# Patient Record
Sex: FEMALE | Employment: OTHER | URBAN - METROPOLITAN AREA
[De-identification: names, ages, dates, MRNs, and addresses within clinical notes are randomized per-mention and may not be internally consistent; named-entity substitution may affect disease eponyms.]

---

## 2017-03-21 ENCOUNTER — HOSPITAL ENCOUNTER (OUTPATIENT)
Dept: NUTRITION | Age: 37
Discharge: HOME | End: 2017-03-21
Payer: COMMERCIAL

## 2017-03-21 PROCEDURE — 97802 MEDICAL NUTRITION INDIV IN: CPT

## 2017-04-18 ENCOUNTER — APPOINTMENT (OUTPATIENT)
Dept: NUTRITION | Age: 37
End: 2017-04-18
Payer: COMMERCIAL

## 2017-05-12 ENCOUNTER — HOSPITAL ENCOUNTER (OUTPATIENT)
Dept: NUTRITION | Age: 37
Discharge: HOME | End: 2017-05-12
Payer: COMMERCIAL

## 2017-05-12 PROCEDURE — 97803 MED NUTRITION INDIV SUBSEQ: CPT

## 2017-05-12 NOTE — PROGRESS NOTES
NUTRITION  DAILY TREATMENT NOTE  Patient Name: Avila Joe         Date: 2017  : 1980    YES Patient  Verified  Insurance: Payor: BLUE CROSS / Plan: [facility] PPO / Product Type: PPO /    Diagnosis:     Morbid Obesity  In time:   1030             Out time:   1100   Total Treatment Time (min):   30     SUBJECTIVE    Medication Changes/New allergies or changes in medical history, any new surgeries or procedures? 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 Doesn't always want to do what her daughter is doing for exercise so she ends up doing nothing. Admits that she continues to drink soda. Not food journaling. Current Wt: 271.6 Previous Wt: 272.6 Wt Change: -1#     OBJECTIVE    Patient Education:  [x]  Review current plan with patient   []  Other:    Handouts/  Information Provided: []  Carbohydrates  []  Protein  []  Fiber  []  Serving Sizes  []  Fluids  []  General guidelines []  Diabetes  []  Cholesterol  []  Sodium  []  SBGM  []  Food Journals  []  Others:    Estimated Needs: 1450 91 gm 163 gm 48 gm    Calories Protein CHO Fat     ASSESSMENT    []  Pt Progressing Well  [x]  Slow Progress []  No Progress    Other:    [x]  Understand Dietary Changes/Recs []  No Change [x]  Improving []  N/A   [x]  Weight-stable []  No Change []  Improving []  N/A     Glucose Levels []  No Change []  Improving []  N/A   []  Cholesterol Levels []  No Change []  Improving []  N/A     RECOMMENDATIONS    1. Pt commits to food journaling completely (including times and beverages) at least 3 days out of 7 per week. 2. Pt will research alternative exercise ideas separate from her daughter-will schedule for the week. Will come up with at least 2 new ideas.    3. Pt will continue to decrease and eliminate sweet drinks-no more than 1 serving per week maximum-discussed alternatives. 4. Pt will set up a schedule for each day at least 4 days per week to include work commitments, meals, exercise to add more structure to her day. 5. PLAN    []  Continue on current plan []  Follow-up PRN   []  Discharge due to :    [x]  Next Appt[de-identified] Friday, June 9, 2017 at 10 a.m.      Dietitian: Kiya Hayes MS, RD, CDE    Date: 5/12/2017 Time: 12:15 PM

## 2017-06-09 ENCOUNTER — APPOINTMENT (OUTPATIENT)
Dept: NUTRITION | Age: 37
End: 2017-06-09

## 2017-08-18 ENCOUNTER — APPOINTMENT (OUTPATIENT)
Dept: NUTRITION | Age: 37
End: 2017-08-18

## 2021-08-05 ENCOUNTER — HOSPITAL ENCOUNTER (OUTPATIENT)
Dept: NUTRITION | Age: 41
Discharge: HOME | End: 2021-08-05
Payer: COMMERCIAL

## 2021-08-05 PROCEDURE — 97802 MEDICAL NUTRITION INDIV IN: CPT

## 2021-09-02 ENCOUNTER — HOSPITAL ENCOUNTER (OUTPATIENT)
Dept: NUTRITION | Age: 41
Discharge: HOME | End: 2021-09-02
Payer: COMMERCIAL

## 2021-09-02 PROCEDURE — 97803 MED NUTRITION INDIV SUBSEQ: CPT

## 2021-09-02 NOTE — PROGRESS NOTES
NUTRITION  FOLLOW-UP TREATMENT NOTE  Patient Name: Leesa Davenport         Date: 2021  : 1980    YES/NO Patient  Verified  Diagnosis: Severe Obesity, Pre-Diabetes   In time:   1100             Out time:   1130   Total Treatment Time (min):   30     SUBJECTIVE/ASSESSMENT    Current Wt: 273.4 Previous Wt: 278 Wt Change: -4.6#     Initial Wt: 278 Total Wt change: -4.6# Height: 67\"     Changes in medication or medical history? Any new allergies, surgeries or procedures? YES/NO    If yes, update Summary List   Patient requested a referral for a sleep study (updated). Has not heard back from the provider to set up the study. Pt has increased her exercise-either walking or working out with  3-4x/week. Nutrition Diagnosis        Diagnosis Status: Severe Obesity related to excess energy intake and physical inactivity as evidenced by BMI >40          [x]  Improved []  No Change    []  Declined        Nutrition Monitoring and Evaluation: -Pt has been much more aware of her absent minded eating and has purposely removed or hidden trigger foods from view. Admits to some sweet tea but has avoided most other sweet drinks. Occasionally going longer than 5 hours without eating and carbohydrates tend to be stockpiled in a later meal.         Nutrition Prescription and  Intervention -Discussed alternatives for the sweet tea and how to \"wean\" her taste off the sweet tea and get used to tea with a lower sugar content or none at all. Reminded pt not to go longer than 5 hours without eating and to eat within 1-2 hours of waking.   Provided suggestions for \"on the go\" protein and controlled carbohydrate meals/snacks       Patient Education:  [x]  Review current plan with patient   []  Other:    Handouts/  Information Provided: []  Carbohydrates  []  Protein  []  Fiber  []  Serving Sizes  []  Fluids  []  General guidelines []  Diabetes  []  Cholesterol  []  Sodium  []  SBGM  []  Food Journals  []  Others:        Patient Goals -Pt will strive for a minimum of 180 minutes of exercise per week  -Pt will follow up on Sleet study referral     PLAN    []  Continue on current plan []  Follow-up PRN   []  Discharge due to :    [x]  Next appt: Thursday, 10-7-2021 at 9 a.m.      Dietitian: Jacy Encarnacion MS,RD, CDCES    Date: 9/2/2021 Time: 12:13 PM

## 2021-10-14 ENCOUNTER — HOSPITAL ENCOUNTER (OUTPATIENT)
Dept: NUTRITION | Age: 41
Discharge: HOME | End: 2021-10-14
Payer: COMMERCIAL

## 2021-10-14 PROCEDURE — 97803 MED NUTRITION INDIV SUBSEQ: CPT

## 2021-10-14 NOTE — PROGRESS NOTES
NUTRITION  FOLLOW-UP TREATMENT NOTE  Patient Name: Bobby Deleon         Date: 10/14/2021  : 1980    YES/NO Patient  Verified  Diagnosis: Severe Obesity, Pre-Diabetes   In time:   1130             Out time:   1215   Total Treatment Time (min):   45     SUBJECTIVE/ASSESSMENT    Current Wt: 271 Previous Wt: 273.4 Wt Change: -2.4     Initial Wt: 278 Total Wt change: -7.0# Height: 67\"     Changes in medication or medical history? Any new allergies, surgeries or procedures? YES/NO    If yes, update Summary List   Pt had COVID at the beginning of September. Is not yet vaccinated. Still some fatigue but has tried to start exercising again. 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 Due to her being sick, still has not had her sleep study done. Nutrition Diagnosis        Diagnosis Status:  Severe Obesity related to excess energy intake and physical inactivity as evidenced by BMI >40              [x]  Improved []  No Change    []  Declined        Nutrition Monitoring and Evaluation: -Pt admits that sometimes, especially when she is teaching, she will go all day without eating. Primarily calorie free beverages but admits to some soda when recovering from [place]. Nutrition Prescription and  Intervention -Reviewed ideas for patient for quick options for food when she is at school.    -Calorie free beverages only  -Talk to her PA about sleep study and still getting vaccinated. Patient Education:  [x]  Review current plan with patient   []  Other:    Handouts/  Information Provided: []  Carbohydrates  []  Protein  []  Fiber  []  Serving Sizes  []  Fluids  []  General guidelines []  Diabetes  []  Cholesterol  []  Sodium  []  SBGM  []  Food Journals  []  Others:        Patient Goals -Pt will continue to work up to 180 minutes of exercise per week.        PLAN    []  Continue on current plan []  Follow-up PRN   []  Discharge due to :    [x]  Next appt: Thursday, 11- at 11:30 a.m.      Dietitian: Yamini Olivera MS,RD, [facility]    Date: 10/14/2021 Time: 11:38 AM

## 2023-01-01 NOTE — PROGRESS NOTES
[facility] - Outpatient Nutrition Services  [address] [facility]. [facility], [address] [place] [address]   Nutrition Assessment  Medical Nutrition Therapy   Outpatient Initial Evaluation         Patient Name: Rory Dodd : 1980   Treatment Diagnosis: Severe Obesity, Pre-Diabetes   Referral Source: Scottie Enriquez Start  Care [facility] [place]): 2021     Gender: female Age: 35 y.o. Ht: 67 in Wt: 278  lb  kg   BMI: 43.5 BMR   Male  BMR Female 1951     Past Medical History:  Includes Impaired fasting glucose, GERD, sleep apnea, Vit D deficiency     Pertinent Medications:   None reported     Biochemical Data:        Assessment:     Pt is a 35 y.o. Female.  with 3 children and a [de-identified] year old granddaughter at home. 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 Saw me for nutrition counseling 3 years ago (with her daughter). Had made some changes but admits that she has reverted back to her old eating behaviors. Very little exercise and complains of feeling fatigued all of the time. Has started working with an online . Will be having a sleep study done. (has been diagnosed with sleep apnea in the past)    Current BMI puts pt in the morbid obesity category. Food & Nutrition: Pt used to  drink regular soda throughout the day, but has cut way down due to exacerbation of skin issues. Does admit to drinking sweet tea, lemonade and fruit juice in addition to water. No regular eating pattern-grazes during the day, very few vegetables and mostly fried foods at dinner. Has started back snacking after dinner. Admits to having sweet drinks and sweets in plain site and usually she and her granddaughter are the only ones who regular eat these foods.          Estimate Needs   Calories: 1450  Protein: 91 Carbs: 163 Fat: 48   Kcal/day  g/day  g/day  g/day        percent: 25  45  30               Nutrition Diagnosis Severe Obesity related to excess energy intake and physical inactivity as evidenced by BMI >40     Nutrition Intervention &  Education: - Reviewed with pt the pathophysiology of insulin resistance and how high insulin levels effect weight and appetite. -.Discussed the benefits of a more structured eating pattern-enc pt to not go longer than 4-5 hrs without eating but also not to eat closer together than every 3 hrs. Enc pt not to eat after her last meal of the day. -.Encouraged pt to work up to a minimum of 150-200 minutes of exercise per week. -.Encouraged pt to drink only calorie free or very low calorie/carb beverages only. Provided examples. Keep all soda out of the house for whole family  -.Educated pt on basic carbohydrate counting encouraging no more than 35-40 gm/meal and 15-20 gm/snack.      Handouts Provided: [x]  Carbohydrates  [x]  Protein  [x]  Non-starchy Vegatbles  []  Food Label  []  Meal and Snack Ideas  []  Food Journals []  Diabetes  []  Cholesterol  [x]  Sodium  [x]  Gen Nutr Guidelines  []  SBGM Guidelines  []  Others:   Information Reviewed with: pt   Readiness to Change Stage: []  Pre-contemplative    []  Contemplative  []  Preparation               [x]  Action                  []  Maintenance   Potential Barriers to Learning: []  Decline in memory    []  Language barrier   []  Other:  []  Emotional                  []  Limited mobility  []  Lack of motivation     [] Vision, hearing or cognitive impairment   Expected Compliance: Fair     Nutritional Goal - To promote lifestyle changes to result in:    [x]  Weight loss  []  Improved diabetic control  []  Decreased cholesterol levels  []  Decreased blood pressure  []  Weight maintenance []  Preventing any interactions associated with food allergies  []  Adequate weight gain toward goal weight  [x]  Other: Increased energy, improved insulin sensitivity and the prevention of Type II Diabetes     Patient Goals:   -Pt will follow up with her sleep study referral and will use CPAP machine if prescribed.   -Pt will not eat after the last meal of the day at least 5 nights out of 7     Dietitian Signature: Clara Marquez MS,RD,[facility] Date: 8/5/2021   Follow-up: Tuesday, 8- Time: 1:05 PM Spontaneous